# Patient Record
Sex: MALE
[De-identification: names, ages, dates, MRNs, and addresses within clinical notes are randomized per-mention and may not be internally consistent; named-entity substitution may affect disease eponyms.]

---

## 2021-07-16 PROBLEM — Z00.00 ENCOUNTER FOR PREVENTIVE HEALTH EXAMINATION: Status: ACTIVE | Noted: 2021-07-16

## 2021-07-19 ENCOUNTER — APPOINTMENT (OUTPATIENT)
Dept: CARDIOLOGY | Facility: CLINIC | Age: 51
End: 2021-07-19
Payer: MEDICAID

## 2021-07-19 ENCOUNTER — NON-APPOINTMENT (OUTPATIENT)
Age: 51
End: 2021-07-19

## 2021-07-19 VITALS
HEIGHT: 71 IN | WEIGHT: 194 LBS | DIASTOLIC BLOOD PRESSURE: 121 MMHG | RESPIRATION RATE: 12 BRPM | HEART RATE: 96 BPM | OXYGEN SATURATION: 98 % | SYSTOLIC BLOOD PRESSURE: 186 MMHG | TEMPERATURE: 98.2 F | BODY MASS INDEX: 27.16 KG/M2

## 2021-07-19 DIAGNOSIS — Z72.0 TOBACCO USE: ICD-10-CM

## 2021-07-19 DIAGNOSIS — Z78.9 OTHER SPECIFIED HEALTH STATUS: ICD-10-CM

## 2021-07-19 PROCEDURE — 99204 OFFICE O/P NEW MOD 45 MIN: CPT

## 2021-07-19 PROCEDURE — 93000 ELECTROCARDIOGRAM COMPLETE: CPT

## 2021-07-19 RX ORDER — ASPIRIN 81 MG/1
81 TABLET, CHEWABLE ORAL
Qty: 30 | Refills: 0 | Status: ACTIVE | COMMUNITY
Start: 2021-07-15

## 2021-07-19 RX ORDER — LOSARTAN POTASSIUM 25 MG/1
25 TABLET, FILM COATED ORAL
Qty: 30 | Refills: 0 | Status: DISCONTINUED | COMMUNITY
Start: 2021-07-08

## 2021-07-19 NOTE — HISTORY OF PRESENT ILLNESS
[FreeTextEntry1] : Reyna is a 51-year-old gentleman smoker hypertension, hyperlipidemia, with elevated BP. He lived in [facility] [place] and took lisinopril 40mg and amlodipine 2.5mg but stopped it two years ago because leg weakness. No medication until came to [place] a few weeks ago. Started on losartan 25mg and then increased to 50mg but still hypertensive. he drinks 6 [employer] espresso every day.

## 2021-07-19 NOTE — REVIEW OF SYSTEMS
[Negative] : Heme/Lymph [SOB] : no shortness of breath [Dyspnea on exertion] : not dyspnea during exertion [Chest Discomfort] : no chest discomfort [Leg Claudication] : no intermittent leg claudication [Palpitations] : no palpitations [Orthopnea] : no orthopnea [Syncope] : no syncope

## 2021-07-19 NOTE — DISCUSSION/SUMMARY
[FreeTextEntry1] : The patient is a 51-year-old gentleman smoker, heavy caffeine use, hyperlipidemia, uncontrolled hypertension. \par #1 Htn- leg weakness when on lisinopril 40mg and amlodipine 2.5mg, increase losartan to 50mg bid and then add chlorthalidone, return for evaluation and echo at that visit\par #2 Lipids- agree with atorvastatin\par #3 General- smoking cessation discussed, patient not willing, caffeine use contributing to tachycardia and hypertension, if willing to decrease or stop then wont need all these medications,

## 2021-08-23 ENCOUNTER — TRANSCRIPTION ENCOUNTER (OUTPATIENT)
Age: 51
End: 2021-08-23

## 2021-08-23 ENCOUNTER — APPOINTMENT (OUTPATIENT)
Dept: CARDIOLOGY | Facility: CLINIC | Age: 51
End: 2021-08-23
Payer: MEDICAID

## 2021-08-23 VITALS
RESPIRATION RATE: 12 BRPM | HEART RATE: 98 BPM | OXYGEN SATURATION: 98 % | DIASTOLIC BLOOD PRESSURE: 80 MMHG | TEMPERATURE: 96.3 F | SYSTOLIC BLOOD PRESSURE: 149 MMHG

## 2021-08-23 DIAGNOSIS — R06.00 DYSPNEA, UNSPECIFIED: ICD-10-CM

## 2021-08-23 DIAGNOSIS — E78.5 HYPERLIPIDEMIA, UNSPECIFIED: ICD-10-CM

## 2021-08-23 DIAGNOSIS — I10 ESSENTIAL (PRIMARY) HYPERTENSION: ICD-10-CM

## 2021-08-23 DIAGNOSIS — E55.9 VITAMIN D DEFICIENCY, UNSPECIFIED: ICD-10-CM

## 2021-08-23 PROCEDURE — 93306 TTE W/DOPPLER COMPLETE: CPT

## 2021-08-23 PROCEDURE — 99214 OFFICE O/P EST MOD 30 MIN: CPT

## 2021-08-23 RX ORDER — CHLORTHALIDONE 25 MG/1
25 TABLET ORAL DAILY
Qty: 90 | Refills: 3 | Status: ACTIVE | COMMUNITY
Start: 2021-07-19 | End: 1900-01-01

## 2021-08-23 RX ORDER — LOSARTAN POTASSIUM 100 MG/1
100 TABLET, FILM COATED ORAL DAILY
Qty: 1 | Refills: 2 | Status: ACTIVE | COMMUNITY
Start: 2021-07-15 | End: 1900-01-01

## 2021-08-23 RX ORDER — ERGOCALCIFEROL 1.25 MG/1
1.25 MG CAPSULE, LIQUID FILLED ORAL
Qty: 12 | Refills: 1 | Status: ACTIVE | COMMUNITY
Start: 2021-07-15 | End: 1900-01-01

## 2021-08-23 RX ORDER — ATORVASTATIN CALCIUM 10 MG/1
10 TABLET, FILM COATED ORAL
Qty: 90 | Refills: 3 | Status: ACTIVE | COMMUNITY
Start: 2021-07-15 | End: 1900-01-01

## 2021-08-23 NOTE — DISCUSSION/SUMMARY
[___ Month(s)] : in [unfilled] month(s) [FreeTextEntry1] : The patient is a 51-year-old gentleman smoker, heavy caffeine use, hyperlipidemia,  hypertension that has markedly improved.. \par #1 Htn- leg weakness when on lisinopril 40mg and amlodipine 2.5mg,continue losartan 100mg and then  chlorthalidone,  echo pending\par #2 Lipids- continue atorvastatin\par #3 General- smoking cessation discussed, patient not willing, caffeine use contributing to tachycardia and hypertension, trying to decrease espresso

## 2021-08-23 NOTE — COUNSELING
[Risk of tobacco use and health benefits of smoking cessation discussed] : Risk of tobacco use and health benefits of smoking cessation discussed [Cessation strategies including cessation program discussed] : Cessation strategies including cessation program discussed [Use of nicotine replacement therapies and other medications discussed] : Use of nicotine replacement therapies and other medications discussed [FreeTextEntry1] : 3

## 2021-08-23 NOTE — HISTORY OF PRESENT ILLNESS
[FreeTextEntry1] : Reyna is feeling much better on current medication regimen. No headaches, dizziness or shortness of breath. Trying to cut down on espresso.